# Patient Record
Sex: FEMALE | Race: WHITE | Employment: FULL TIME | URBAN - METROPOLITAN AREA
[De-identification: names, ages, dates, MRNs, and addresses within clinical notes are randomized per-mention and may not be internally consistent; named-entity substitution may affect disease eponyms.]

---

## 2018-03-14 ENCOUNTER — HOSPITAL ENCOUNTER (OUTPATIENT)
Dept: SURGERY | Age: 53
Discharge: HOME | End: 2018-03-14

## 2018-03-14 VITALS — BODY MASS INDEX: 22.49 KG/M2 | HEIGHT: 65 IN | WEIGHT: 135 LBS

## 2018-03-14 RX ORDER — NAPROXEN SODIUM 220 MG
220 TABLET ORAL
COMMUNITY

## 2018-03-14 RX ORDER — ESTRADIOL 1 MG/1
1 TABLET ORAL DAILY
COMMUNITY

## 2018-03-14 RX ORDER — NITROFURANTOIN (MACROCRYSTALS) 100 MG/1
CAPSULE ORAL DAILY
COMMUNITY

## 2018-03-14 NOTE — PERIOP NOTES
Patient verified name, , and surgery as listed in [facility]. Type 2 surgery, PAT phone assessment complete. Orders NOT received. Labs per surgeon: Orders will be faxed to [phone] after patient's pre-op appointment on 3/15/18. Labs per anesthesia protocol: Hgb, K+, Creatinine; orders signed and held in [facility]. Patient coming to Entrance B/Outpatient lab prior to surgery to have blood work completed. Patient answered medical/surgical history questions at their best of ability. All prior to admission medications documented in [facility]. Patient instructed to take the following medications the day of surgery according to anesthesia guidelines with a small sip of water: Estradiol. Hold all vitamins 7 days prior to surgery and NSAIDS 5 days prior to surgery. Medications to be held: Aleve. Patient instructed on the following:  Arrive at [address], time of arrival to be called the day before by 1700  NPO after midnight including gum, mints, and ice chips  Responsible adult must drive patient to the hospital, stay during surgery, and patient will  need supervision 24 hours after anesthesia  Use antibacterial soap in shower the night before surgery and on the morning of surgery  Leave all valuables (money and jewelry) at home but bring insurance card and ID on       DOS  Do not wear make-up, nail polish, lotions, cologne, perfumes, powders, or oil on skin. Patient teach back successful and patient demonstrates knowledge of instruction.

## 2018-03-15 ENCOUNTER — HOSPITAL ENCOUNTER (OUTPATIENT)
Dept: LAB | Age: 53
Discharge: HOME | End: 2018-03-15
Attending: PLASTIC SURGERY
Payer: COMMERCIAL

## 2018-03-15 LAB
CREAT SERPL-MCNC: 1.31 MG/DL (ref 0.6–1)
HGB BLD-MCNC: 14.7 G/DL (ref 11.7–15.4)
POTASSIUM SERPL-SCNC: 4 MMOL/L (ref 3.5–5.1)

## 2018-03-15 PROCEDURE — 84132 ASSAY OF SERUM POTASSIUM: CPT | Performed by: PLASTIC SURGERY

## 2018-03-15 PROCEDURE — 36415 COLL VENOUS BLD VENIPUNCTURE: CPT | Performed by: PLASTIC SURGERY

## 2018-03-15 PROCEDURE — 85018 HEMOGLOBIN: CPT | Performed by: PLASTIC SURGERY

## 2018-03-15 PROCEDURE — 82565 ASSAY OF CREATININE: CPT | Performed by: PLASTIC SURGERY

## 2018-03-15 NOTE — PERIOP NOTES
Lab results within limits per anesthesia protocol; OK for surgery.      Recent Results (from the past 12 hour(s))   CREATININE    Collection Time: 03/15/18  2:31 PM   Result Value Ref Range    Creatinine 1.31 (H) 0.6 - 1.0 MG/DL   HEMOGLOBIN    Collection Time: 03/15/18  2:31 PM   Result Value Ref Range    HGB 14.7 11.7 - 15.4 g/dL   POTASSIUM    Collection Time: 03/15/18  2:31 PM   Result Value Ref Range    Potassium 4.0 3.5 - 5.1 mmol/L

## 2018-03-21 ENCOUNTER — HOSPITAL ENCOUNTER (OUTPATIENT)
Age: 53
Setting detail: OUTPATIENT SURGERY
Discharge: HOME | End: 2018-03-21
Attending: PLASTIC SURGERY | Admitting: PLASTIC SURGERY
Payer: COMMERCIAL

## 2018-03-21 ENCOUNTER — ANESTHESIA EVENT (OUTPATIENT)
Dept: SURGERY | Age: 53
End: 2018-03-21
Payer: COMMERCIAL

## 2018-03-21 ENCOUNTER — ANESTHESIA (OUTPATIENT)
Dept: SURGERY | Age: 53
End: 2018-03-21
Payer: COMMERCIAL

## 2018-03-21 VITALS
TEMPERATURE: 97.6 F | OXYGEN SATURATION: 95 % | SYSTOLIC BLOOD PRESSURE: 115 MMHG | BODY MASS INDEX: 22.92 KG/M2 | DIASTOLIC BLOOD PRESSURE: 72 MMHG | RESPIRATION RATE: 20 BRPM | WEIGHT: 137.56 LBS | HEIGHT: 65 IN | HEART RATE: 75 BPM

## 2018-03-21 PROCEDURE — 77030008467 HC STPLR SKN COVD -B: Performed by: PLASTIC SURGERY

## 2018-03-21 PROCEDURE — 76210000006 HC OR PH I REC 0.5 TO 1 HR: Performed by: PLASTIC SURGERY

## 2018-03-21 PROCEDURE — 74011000258 HC RX REV CODE- 258: Performed by: PLASTIC SURGERY

## 2018-03-21 PROCEDURE — 74011250636 HC RX REV CODE- 250/636: Performed by: ANESTHESIOLOGY

## 2018-03-21 PROCEDURE — 74011250636 HC RX REV CODE- 250/636

## 2018-03-21 PROCEDURE — 74011000250 HC RX REV CODE- 250: Performed by: ANESTHESIOLOGY

## 2018-03-21 PROCEDURE — 88307 TISSUE EXAM BY PATHOLOGIST: CPT | Performed by: PLASTIC SURGERY

## 2018-03-21 PROCEDURE — 77030031139 HC SUT VCRL2 J&J -A: Performed by: PLASTIC SURGERY

## 2018-03-21 PROCEDURE — 77030013567 HC DRN WND RESERV BARD -A: Performed by: PLASTIC SURGERY

## 2018-03-21 PROCEDURE — 77030020143 HC AIRWY LARYN INTUB CGAS -A: Performed by: ANESTHESIOLOGY

## 2018-03-21 PROCEDURE — 77030032490 HC SLV COMPR SCD KNE COVD -B: Performed by: PLASTIC SURGERY

## 2018-03-21 PROCEDURE — 74011000250 HC RX REV CODE- 250

## 2018-03-21 PROCEDURE — 77030018836 HC SOL IRR NACL ICUM -A: Performed by: PLASTIC SURGERY

## 2018-03-21 PROCEDURE — 77030011640 HC PAD GRND REM COVD -A: Performed by: PLASTIC SURGERY

## 2018-03-21 PROCEDURE — 74011250636 HC RX REV CODE- 250/636: Performed by: PLASTIC SURGERY

## 2018-03-21 PROCEDURE — 77030012414: Performed by: PLASTIC SURGERY

## 2018-03-21 PROCEDURE — 77030002933 HC SUT MCRYL J&J -A: Performed by: PLASTIC SURGERY

## 2018-03-21 PROCEDURE — 77030002996 HC SUT SLK J&J -A: Performed by: PLASTIC SURGERY

## 2018-03-21 PROCEDURE — 77030020782 HC GWN BAIR PAWS FLX 3M -B: Performed by: ANESTHESIOLOGY

## 2018-03-21 PROCEDURE — 76010000171 HC OR TIME 2 TO 2.5 HR INTENSV-TIER 1: Performed by: PLASTIC SURGERY

## 2018-03-21 PROCEDURE — 74011000250 HC RX REV CODE- 250: Performed by: PLASTIC SURGERY

## 2018-03-21 PROCEDURE — 76060000035 HC ANESTHESIA 2 TO 2.5 HR: Performed by: PLASTIC SURGERY

## 2018-03-21 PROCEDURE — 74011250637 HC RX REV CODE- 250/637: Performed by: ANESTHESIOLOGY

## 2018-03-21 PROCEDURE — 77030002916 HC SUT ETHLN J&J -A: Performed by: PLASTIC SURGERY

## 2018-03-21 PROCEDURE — 76010000131 HC OR TIME 2 TO 2.5 HR: Performed by: PLASTIC SURGERY

## 2018-03-21 RX ORDER — ACETAMINOPHEN 500 MG
1000 TABLET ORAL ONCE
Status: COMPLETED | OUTPATIENT
Start: 2018-03-21 | End: 2018-03-21

## 2018-03-21 RX ORDER — SODIUM CHLORIDE, SODIUM LACTATE, POTASSIUM CHLORIDE, CALCIUM CHLORIDE 600; 310; 30; 20 MG/100ML; MG/100ML; MG/100ML; MG/100ML
1000 INJECTION, SOLUTION INTRAVENOUS CONTINUOUS
Status: DISCONTINUED | OUTPATIENT
Start: 2018-03-21 | End: 2018-03-21 | Stop reason: HOSPADM

## 2018-03-21 RX ORDER — FENTANYL CITRATE 50 UG/ML
INJECTION, SOLUTION INTRAMUSCULAR; INTRAVENOUS AS NEEDED
Status: DISCONTINUED | OUTPATIENT
Start: 2018-03-21 | End: 2018-03-21 | Stop reason: HOSPADM

## 2018-03-21 RX ORDER — LIDOCAINE HYDROCHLORIDE 10 MG/ML
0.1 INJECTION INFILTRATION; PERINEURAL AS NEEDED
Status: DISCONTINUED | OUTPATIENT
Start: 2018-03-21 | End: 2018-03-21 | Stop reason: HOSPADM

## 2018-03-21 RX ORDER — MIDAZOLAM HYDROCHLORIDE 1 MG/ML
2 INJECTION, SOLUTION INTRAMUSCULAR; INTRAVENOUS
Status: COMPLETED | OUTPATIENT
Start: 2018-03-21 | End: 2018-03-21

## 2018-03-21 RX ORDER — KETOROLAC TROMETHAMINE 30 MG/ML
30 INJECTION, SOLUTION INTRAMUSCULAR; INTRAVENOUS ONCE
Status: COMPLETED | OUTPATIENT
Start: 2018-03-21 | End: 2018-03-21

## 2018-03-21 RX ORDER — SCOLOPAMINE TRANSDERMAL SYSTEM 1 MG/1
1 PATCH, EXTENDED RELEASE TRANSDERMAL ONCE
Status: DISCONTINUED | OUTPATIENT
Start: 2018-03-21 | End: 2018-03-21 | Stop reason: HOSPADM

## 2018-03-21 RX ORDER — SODIUM CHLORIDE 0.9 % (FLUSH) 0.9 %
5-10 SYRINGE (ML) INJECTION AS NEEDED
Status: DISCONTINUED | OUTPATIENT
Start: 2018-03-21 | End: 2018-03-21 | Stop reason: HOSPADM

## 2018-03-21 RX ORDER — ONDANSETRON 2 MG/ML
INJECTION INTRAMUSCULAR; INTRAVENOUS AS NEEDED
Status: DISCONTINUED | OUTPATIENT
Start: 2018-03-21 | End: 2018-03-21 | Stop reason: HOSPADM

## 2018-03-21 RX ORDER — PROPOFOL 10 MG/ML
INJECTION, EMULSION INTRAVENOUS AS NEEDED
Status: DISCONTINUED | OUTPATIENT
Start: 2018-03-21 | End: 2018-03-21 | Stop reason: HOSPADM

## 2018-03-21 RX ORDER — DEXAMETHASONE SODIUM PHOSPHATE 4 MG/ML
INJECTION, SOLUTION INTRA-ARTICULAR; INTRALESIONAL; INTRAMUSCULAR; INTRAVENOUS; SOFT TISSUE AS NEEDED
Status: DISCONTINUED | OUTPATIENT
Start: 2018-03-21 | End: 2018-03-21 | Stop reason: HOSPADM

## 2018-03-21 RX ORDER — SODIUM CHLORIDE 0.9 % (FLUSH) 0.9 %
5-10 SYRINGE (ML) INJECTION EVERY 8 HOURS
Status: DISCONTINUED | OUTPATIENT
Start: 2018-03-21 | End: 2018-03-21 | Stop reason: HOSPADM

## 2018-03-21 RX ORDER — HYDROMORPHONE HYDROCHLORIDE 2 MG/ML
0.5 INJECTION, SOLUTION INTRAMUSCULAR; INTRAVENOUS; SUBCUTANEOUS
Status: DISCONTINUED | OUTPATIENT
Start: 2018-03-21 | End: 2018-03-21 | Stop reason: HOSPADM

## 2018-03-21 RX ORDER — ALBUTEROL SULFATE 0.83 MG/ML
2.5 SOLUTION RESPIRATORY (INHALATION) AS NEEDED
Status: DISCONTINUED | OUTPATIENT
Start: 2018-03-21 | End: 2018-03-21 | Stop reason: HOSPADM

## 2018-03-21 RX ORDER — ONDANSETRON 2 MG/ML
4 INJECTION INTRAMUSCULAR; INTRAVENOUS
Status: DISCONTINUED | OUTPATIENT
Start: 2018-03-21 | End: 2018-03-21 | Stop reason: HOSPADM

## 2018-03-21 RX ORDER — LIDOCAINE HYDROCHLORIDE 20 MG/ML
INJECTION, SOLUTION EPIDURAL; INFILTRATION; INTRACAUDAL; PERINEURAL AS NEEDED
Status: DISCONTINUED | OUTPATIENT
Start: 2018-03-21 | End: 2018-03-21 | Stop reason: HOSPADM

## 2018-03-21 RX ADMIN — KETOROLAC TROMETHAMINE 30 MG: 30 INJECTION, SOLUTION INTRAMUSCULAR at 10:52

## 2018-03-21 RX ADMIN — FENTANYL CITRATE 100 MCG: 50 INJECTION, SOLUTION INTRAMUSCULAR; INTRAVENOUS at 09:18

## 2018-03-21 RX ADMIN — FENTANYL CITRATE 50 MCG: 50 INJECTION, SOLUTION INTRAMUSCULAR; INTRAVENOUS at 08:37

## 2018-03-21 RX ADMIN — SODIUM CHLORIDE, SODIUM LACTATE, POTASSIUM CHLORIDE, AND CALCIUM CHLORIDE 1000 ML: 600; 310; 30; 20 INJECTION, SOLUTION INTRAVENOUS at 06:25

## 2018-03-21 RX ADMIN — FENTANYL CITRATE 50 MCG: 50 INJECTION, SOLUTION INTRAMUSCULAR; INTRAVENOUS at 08:23

## 2018-03-21 RX ADMIN — HYDROMORPHONE HYDROCHLORIDE 0.5 MG: 2 INJECTION, SOLUTION INTRAMUSCULAR; INTRAVENOUS; SUBCUTANEOUS at 10:34

## 2018-03-21 RX ADMIN — MIDAZOLAM HYDROCHLORIDE 2 MG: 1 INJECTION, SOLUTION INTRAMUSCULAR; INTRAVENOUS at 08:11

## 2018-03-21 RX ADMIN — LIDOCAINE HYDROCHLORIDE 0.1 ML: 10 INJECTION, SOLUTION INFILTRATION; PERINEURAL at 06:25

## 2018-03-21 RX ADMIN — LIDOCAINE HYDROCHLORIDE 100 MG: 20 INJECTION, SOLUTION EPIDURAL; INFILTRATION; INTRACAUDAL; PERINEURAL at 08:23

## 2018-03-21 RX ADMIN — DEXAMETHASONE SODIUM PHOSPHATE 5 MG: 4 INJECTION, SOLUTION INTRA-ARTICULAR; INTRALESIONAL; INTRAMUSCULAR; INTRAVENOUS; SOFT TISSUE at 08:37

## 2018-03-21 RX ADMIN — ACETAMINOPHEN 1000 MG: 500 TABLET, FILM COATED ORAL at 07:00

## 2018-03-21 RX ADMIN — PROPOFOL 100 MG: 10 INJECTION, EMULSION INTRAVENOUS at 08:23

## 2018-03-21 RX ADMIN — ONDANSETRON 4 MG: 2 INJECTION INTRAMUSCULAR; INTRAVENOUS at 08:37

## 2018-03-21 RX ADMIN — CEFAZOLIN 1 G: 1 INJECTION, POWDER, FOR SOLUTION INTRAMUSCULAR; INTRAVENOUS; PARENTERAL at 08:22

## 2018-03-21 RX ADMIN — HYDROMORPHONE HYDROCHLORIDE 0.5 MG: 2 INJECTION, SOLUTION INTRAMUSCULAR; INTRAVENOUS; SUBCUTANEOUS at 10:49

## 2018-03-21 NOTE — ANESTHESIA PREPROCEDURE EVALUATION
Anesthetic History     PONV          Review of Systems / Medical History  Patient summary reviewed and pertinent labs reviewed    Pulmonary                   Neuro/Psych   Within defined limits           Cardiovascular    Hypertension                   GI/Hepatic/Renal         Renal disease: CRI       Endo/Other             Other Findings              Physical Exam    Airway  Mallampati: II  TM Distance: 4 - 6 cm  Neck ROM: normal range of motion   Mouth opening: Normal     Cardiovascular    Rhythm: regular  Rate: normal      Pertinent negatives: No murmur, JVD and peripheral edema   Dental  No notable dental hx       Pulmonary  Breath sounds clear to auscultation               Abdominal         Other Findings            Anesthetic Plan    ASA: 2  Anesthesia type: general          Induction: Intravenous  Anesthetic plan and risks discussed with: Patient      LMA general

## 2018-03-21 NOTE — DISCHARGE INSTRUCTIONS
Keep dressings in place until follow up  Medications as perscribed     Breast Reduction: What to Expect at [address] [facility]  Breast reduction surgery removes some of the breast tissue and skin from the breasts. This reshapes and lifts the breasts and reduces their size. It can also make the dark area around the nipple smaller. After surgery, you will probably feel weak. You may feel sore for 2 to 3 weeks. You also may feel pulling or stretching in your breast area. Although you may need pain medicine for a week or two, you can expect to feel better and stronger each day. For several weeks, you may get tired easily or have less energy than usual. You also may have the feeling that fluid is moving in your breasts. This feeling is normal and will go away over time. Stitches usually are removed in 5 to 10 days. Your new breasts may feel firmer and look rounder. Breast reduction may change the normal feeling in your breast. But in time, some feeling may return. Keep in mind that it may take time to get used to your new breasts. You will have swelling at first, but the breasts will soften and develop better shape over time. This care sheet gives you a general idea about how long it will take for you to recover. But each person recovers at a different pace. Follow the steps below to get better as quickly as possible. How can you care for yourself at home? Activity  ? · Rest when you feel tired. Getting enough sleep will help you recover. ? · For about 2 weeks after surgery, avoid lifting anything that would make you strain. This may include heavy grocery bags and milk containers, a heavy briefcase or backpack, cat litter or dog food bags, a vacuum , or a child. Do not lift anything over your head for 2 to 3 weeks. ? · Ask your doctor when you can drive again. ? · Ask your doctor when it is okay for you to have sex. ? · You can take your first shower the day after your drain or bandage is removed. This is usually within about 1 week. Sometimes doctors say it is okay to shower the day after surgery. Do not take a bath or soak in a hot tub for about 4 weeks. ? · You will probably be able to go back to work or your normal routine in 2 to 3 weeks. This depends on the type of work you do and any further treatment. Diet  ? · You can eat your normal diet. If your stomach is upset, try bland, low-fat foods like plain rice, broiled chicken, toast, and yogurt. ? · Drink plenty of fluids (unless your doctor tells you not to). ? · You may notice that your bowel movements are not regular right after your surgery. This is common. Try to avoid constipation and straining with bowel movements. Take a fiber supplement. If you have not had a bowel movement after a couple of days, take a mild laxative. Medicines  ? · Your doctor will tell you if and when you can restart your medicines. He or she will also give you instructions about taking any new medicines. ? · If you take blood thinners, such as warfarin (Coumadin), clopidogrel (Plavix), or aspirin, be sure to talk to your doctor. He or she will tell you if and when to start taking those medicines again. Make sure that you understand exactly what your doctor wants you to do. ? · Take pain medicines exactly as directed. ¨ If the doctor gave you a prescription medicine for pain, take it as prescribed. ¨ If you are not taking a prescription pain medicine, ask your doctor if you can take an over-the-counter medicine. ? · If you think your pain medicine is making you sick to your stomach:  ¨ Take your medicine after meals (unless your doctor has told you not to). ¨ Ask your doctor for a different pain medicine. ? · If you were given medicine for nausea, take it as directed. ? · If your doctor prescribed antibiotics, take them as directed. Do not stop taking them just because you feel better. You need to take the full course of antibiotics. Incision care  ? · If your doctor gave you specific instructions on how to care for your incision, follow those instructions. ? · You may be wearing a special bra that holds your bandages in place after the surgery. Your doctor will tell you when you can stop wearing the bra. Your doctor may want you to wear the bra at night as well as during the day for several weeks. Do not wear an underwire bra for 1 month. ? · If you have strips of tape on your incision, leave the tape on for a week or until it falls off. Or follow your doctor's instructions for removing the tape. ? · Wash the area daily with warm, soapy water, and pat it dry. Don't use hydrogen peroxide or alcohol, which can slow healing. ? · You may cover the area with a gauze bandage if it weeps or rubs against clothing. Change the bandage every day. Consider having someone help you with this. Exercise  ? · Try to walk each day. Start by walking a little more than you did the day before. Bit by bit, increase the amount you walk. Walking boosts blood flow and helps prevent pneumonia and constipation. ? · Avoid strenuous activities, such as bicycle riding, jogging, weight lifting, or aerobic exercise, until your doctor says it is okay. ? · Your doctor will tell you when to begin stretching exercises and normal activities. ?Ice  ? · Put ice or a cold pack over your breast for 10 to 20 minutes at a time. Try to do this every 1 to 2 hours for the next 3 days (when you are awake) or until the swelling goes down. Put a thin cloth between the ice and your skin. Other instructions  ? · You may have one or more drains near your incisions. Your doctor will tell you how to take care of them. Drains are usually removed in the first week after surgery. Follow-up care is a key part of your treatment and safety. Be sure to make and go to all appointments, and call your doctor if you are having problems.  It's also a good idea to know your test results and keep a list of the medicines you take. When should you call for help? Call 911 anytime you think you may need emergency care. For example, call if:  ? · You passed out (lost consciousness). ? · You have sudden chest pain and shortness of breath, or you cough up blood. ?Call your doctor now or seek immediate medical care if:  ? · You have pain that does not get better after you take pain medicine. ? · You have loose stitches, or your incision comes open. ? · You are bleeding from the incision. ? · You have signs of infection, such as:  ¨ Increased pain, swelling, warmth, or redness. ¨ Red streaks leading from the incision. ¨ Pus draining from the incision. ¨ A fever. ? · You have signs of a blood clot in your leg, such as:  ¨ Pain in your calf, back of the knee, thigh, or groin. ¨ Redness and swelling in your leg or groin. ? Watch closely for any changes in your health, and be sure to contact your doctor if:  ? · You do not get better as expected. Where can you learn more? Go to http://ngocAgisticserin.info/. Enter T113 in the search box to learn more about \"Breast Reduction: What to Expect at Home. \"  Current as of: October 13, 2016  Content Version: 11.4  © 6513-5204 [employer] Care instructions adapted under license by [employer] (which disclaims liability or warranty for this information). If you have questions about a medical condition or this instruction, always ask your healthcare professional. [place] [address] any warranty or liability for your use of this information. Surgical Drain Care: Care Instructions  What is a surgical drain? After a surgery, fluid may collect inside your body in the surgical area. This makes an infection or other problems more likely. A surgical drain allows the fluid to flow out. The doctor will put a thin rubber tube into the area of your body where the fluid is likely to collect.  The rubber tube will carry the fluid outside your body. The most common type of surgical drain carries the fluid into a collection bulb that you empty. This is called a Elton-Blanco drain. The drain uses suction created by the bulb to pull the fluid from your body into the bulb. The rubber tube will probably be held in place by one or two stitches in your skin. Most people attach the bulb with a safety pin to clothing or near the bandage so that it doesn't flip around or pull on the stitches. When you first get the drain, the fluid will be bloody. It will change color from red to pink to a light yellow or clear as the wound heals and the fluid starts to go away. Your doctor may give you specific information on when you no longer need the drain and when it will be removed. In general, you will need the drain until you are collecting less than about 2 tablespoons of fluid in 24 hours. Follow-up care is a key part of your treatment and safety. Be sure to make and go to all appointments, and call your doctor if you are having problems. It's also a good idea to know your test results and keep a list of the medicines you take. How can you care for yourself at home? Fluid collection  Follow any instructions your doctor gives you. How often you empty the bulb depends on how much fluid is draining. Empty the bulb when it is half full. To empty the bulb:  · Wash your hands with soap and water. · Take the plug out of the bulb. · Empty the bulb. If your doctor asks you to measure the fluid, empty the fluid into a measuring cup, and write down the color and how much you collected. Your doctor will want to know this information. How often you empty the bulb depends on how much fluid there is. Doctors often suggest emptying it when it's about half full. · Clean the plug with alcohol. · Squeeze the bulb until it is flat. This removes all the air from the bulb. You may need to put the bulb on a table or a counter to flatten it.   · Keep the bulb flat and put the plug in. · The bulb should stay flat after you put the plug back in. This creates the suction that pulls the fluid into the bulb. · Empty the fluid into the toilet. · Wash your hands. Bandage care  You may have a bandage. Your doctor will tell you how often to change it. · Wash your hands with soap and water. · Take off the bandage from around the drain. · Clean the drain site and the skin around it with soap and water. Use gauze or a cotton swab. · When the site is dry, put on a new bandage. Drain care  Squeezing or \"milking\" the tube can help prevent clogs so that it drains correctly. Your doctor will tell you when you need to do this. In general, you do this when:  · You see a clot in the tube that is preventing fluid from draining. The clot may look like a dark, stringy lining. · You see fluid leaking around the tube where it goes into the skin. · You think there is no suction in the drain. To milk the tube:  · Use one hand to hold and pinch the tube where it leaves the skin. · With the other hand, pinch the tube with your thumb and first finger just below where you're holding it. · Slowly and firmly push your thumb and first finger down the tubing toward the bulb. · Do this as many times as you need to. The clot should move down the tube and into the bulb. When should you call for help? Call your doctor now or seek immediate medical care if:  ? · You have signs of infection, such as:  ¨ Increased pain, swelling, warmth, or redness around the area. ¨ Red streaks leading from the area. ¨ Pus draining from the area. ¨ A fever. ? · You see a sudden change in the color or smell of the drainage. ? · The tube is coming loose where it leaves your skin. ? Watch closely for changes in your health, and be sure to contact your doctor if:  ? · You see a lot of fluid around the drain. ? · You cannot remove a clot from the tube by milking the tube. Where can you learn more?   Go to http://ngoc-erin.info/. Enter K117 in the search box to learn more about \"Surgical Drain Care: Care Instructions. \"  Current as of: March 20, 2017  Content Version: 11.4  © 8611-1177 [employer]. Care instructions adapted under license by [employer] (which disclaims liability or warranty for this information). If you have questions about a medical condition or this instruction, always ask your healthcare professional. [place] [address] any warranty or liability for your use of this information.

## 2018-03-21 NOTE — BRIEF OP NOTE
BRIEF OPERATIVE NOTE    Date of Procedure: 3/21/2018   Preoperative Diagnosis: Breast hypertrophy [N62]  Breast pain [N64.4]  Disorder of neck [M53.82]  Low back pain with sciatica, sciatica laterality unspecified, unspecified back pain laterality, unspecified chronicity [M54.40]  Necrosis, breast fat [N64.1]  Postoperative Diagnosis: Breast hypertrophy [N62]  Breast pain     Procedure(s):  BILATERAL BREAST REDUCTION  Surgeon(s) and Role:     * Darnell Pérez MD - Primary         Assistant Staff: Patrice COX      Surgical Staff:  Circ-1: Hugh Vega RN  Scrub Tech-1: Mary Jane Edwardsison  Event Time In   Incision Start 8676   Incision Close      Anesthesia: General   Estimated Blood Loss: 50cc  Specimens:   ID Type Source Tests Collected by Time Destination   1 : LEFT BREAST TISSUE Fresh Breast  Darnell Pérez MD 3/21/2018 9348 Pathology   2 : RIGHT BREAST TISSUE Fresh Breast  Darnell Pérez MD 3/21/2018 7452 Pathology      Findings: Right Breast 471 grams  Left breast 088 grams  Complications: none  Implants: * No implants in log *

## 2018-03-21 NOTE — IP AVS SNAPSHOT
Evelia Hughes 
 
 
 [address] [id number] 
[phone] Patient: Katharina Jean MRN: [id number] :1965 About your hospitalization You were admitted on:  2018 You last received care in the:  [facility] PACU You were discharged on:  2018 Why you were hospitalized Your primary diagnosis was:  Not on File Follow-up Information Follow up With Details Comments Contact Info Angel Vasquez MD Follow up on 3/26/2018 Follow up on Monday [address] [facility] [address] Surgery [place] Horan [id number] 
[phone] Lula Tovar, [address] [place] [address] 
[phone] Discharge Orders None A check katerina indicates which time of day the medication should be taken. My Medications CONTINUE taking these medications Instructions Each Dose to Equal  
 Morning Noon Evening Bedtime ALEVE 220 mg tablet Generic drug:  naproxen sodium Your last dose was: Your next dose is: Take 220 mg by mouth daily as needed. 220 mg  
    
   
   
   
  
 BENAZEPRIL-HYDROCHLOROTHIAZIDE PO Your last dose was: Your next dose is: Take  by mouth daily. ESTRACE 1 mg tablet Generic drug:  estradiol Your last dose was: Your next dose is: Take 1 mg by mouth daily. 1 mg MACRODANTIN 100 mg capsule Generic drug:  nitrofurantoin Your last dose was: Your next dose is: Take  by mouth daily. Indications: PREVENTION OF BACTERIAL URINARY TRACT INFECTION Discharge Instructions Keep dressings in place until follow up Medications as perscribed Breast Reduction: What to Expect at [facility] Your Recovery Breast reduction surgery removes some of the breast tissue and skin from the breasts. This reshapes and lifts the breasts and reduces their size. It can also make the dark area around the nipple smaller. After surgery, you will probably feel weak. You may feel sore for 2 to 3 weeks. You also may feel pulling or stretching in your breast area. Although you may need pain medicine for a week or two, you can expect to feel better and stronger each day. For several weeks, you may get tired easily or have less energy than usual. You also may have the feeling that fluid is moving in your breasts. This feeling is normal and will go away over time. Stitches usually are removed in 5 to 10 days. Your new breasts may feel firmer and look rounder. Breast reduction may change the normal feeling in your breast. But in time, some feeling may return. Keep in mind that it may take time to get used to your new breasts. You will have swelling at first, but the breasts will soften and develop better shape over time. This care sheet gives you a general idea about how long it will take for you to recover. But each person recovers at a different pace. Follow the steps below to get better as quickly as possible. How can you care for yourself at home? Activity ? · Rest when you feel tired. Getting enough sleep will help you recover. ? · For about 2 weeks after surgery, avoid lifting anything that would make you strain. This may include heavy grocery bags and milk containers, a heavy briefcase or backpack, cat litter or dog food bags, a vacuum , or a child. Do not lift anything over your head for 2 to 3 weeks. ? · Ask your doctor when you can drive again. ? · Ask your doctor when it is okay for you to have sex. ? · You can take your first shower the day after your drain or bandage is removed. This is usually within about 1 week. Sometimes doctors say it is okay to shower the day after surgery. Do not take a bath or soak in a hot tub for about 4 weeks. ? · You will probably be able to go back to work or your normal routine in 2 to 3 weeks. This depends on the type of work you do and any further treatment. Diet ? · You can eat your normal diet. If your stomach is upset, try bland, low-fat foods like plain rice, broiled chicken, toast, and yogurt. ? · Drink plenty of fluids (unless your doctor tells you not to). ? · You may notice that your bowel movements are not regular right after your surgery. This is common. Try to avoid constipation and straining with bowel movements. Take a fiber supplement. If you have not had a bowel movement after a couple of days, take a mild laxative. Medicines ? · Your doctor will tell you if and when you can restart your medicines. He or she will also give you instructions about taking any new medicines. ? · If you take blood thinners, such as warfarin (Coumadin), clopidogrel (Plavix), or aspirin, be sure to talk to your doctor. He or she will tell you if and when to start taking those medicines again. Make sure that you understand exactly what your doctor wants you to do. ? · Take pain medicines exactly as directed. ¨ If the doctor gave you a prescription medicine for pain, take it as prescribed. ¨ If you are not taking a prescription pain medicine, ask your doctor if you can take an over-the-counter medicine. ? · If you think your pain medicine is making you sick to your stomach: 
¨ Take your medicine after meals (unless your doctor has told you not to). ¨ Ask your doctor for a different pain medicine. ? · If you were given medicine for nausea, take it as directed. ? · If your doctor prescribed antibiotics, take them as directed. Do not stop taking them just because you feel better. You need to take the full course of antibiotics. Incision care ? · If your doctor gave you specific instructions on how to care for your incision, follow those instructions. ? · You may be wearing a special bra that holds your bandages in place after the surgery. Your doctor will tell you when you can stop wearing the bra. Your doctor may want you to wear the bra at night as well as during the day for several weeks. Do not wear an underwire bra for 1 month. ? · If you have strips of tape on your incision, leave the tape on for a week or until it falls off. Or follow your doctor's instructions for removing the tape. ? · Wash the area daily with warm, soapy water, and pat it dry. Don't use hydrogen peroxide or alcohol, which can slow healing. ? · You may cover the area with a gauze bandage if it weeps or rubs against clothing. Change the bandage every day. Consider having someone help you with this. Exercise ? · Try to walk each day. Start by walking a little more than you did the day before. Bit by bit, increase the amount you walk. Walking boosts blood flow and helps prevent pneumonia and constipation. ? · Avoid strenuous activities, such as bicycle riding, jogging, weight lifting, or aerobic exercise, until your doctor says it is okay. ? · Your doctor will tell you when to begin stretching exercises and normal activities. ?Ice ? · Put ice or a cold pack over your breast for 10 to 20 minutes at a time. Try to do this every 1 to 2 hours for the next 3 days (when you are awake) or until the swelling goes down. Put a thin cloth between the ice and your skin. Other instructions ? · You may have one or more drains near your incisions. Your doctor will tell you how to take care of them. Drains are usually removed in the first week after surgery. Follow-up care is a key part of your treatment and safety. Be sure to make and go to all appointments, and call your doctor if you are having problems. It's also a good idea to know your test results and keep a list of the medicines you take. When should you call for help? Call 911 anytime you think you may need emergency care. For example, call if: 
? · You passed out (lost consciousness). ? · You have sudden chest pain and shortness of breath, or you cough up blood. ?Call your doctor now or seek immediate medical care if: 
? · You have pain that does not get better after you take pain medicine. ? · You have loose stitches, or your incision comes open. ? · You are bleeding from the incision. ? · You have signs of infection, such as: 
¨ Increased pain, swelling, warmth, or redness. ¨ Red streaks leading from the incision. ¨ Pus draining from the incision. ¨ A fever. ? · You have signs of a blood clot in your leg, such as: 
¨ Pain in your calf, back of the knee, thigh, or groin. ¨ Redness and swelling in your leg or groin. ? Watch closely for any changes in your health, and be sure to contact your doctor if: 
? · You do not get better as expected. Where can you learn more? Go to http://ngoc-erin.info/. Enter T113 in the search box to learn more about \"Breast Reduction: What to Expect at Home. \" Current as of: October 13, 2016 Content Version: 11.4 © 6060-6943 [employer]. Care instructions adapted under license by [employer] (which disclaims liability or warranty for this information). If you have questions about a medical condition or this instruction, always ask your healthcare professional. [place] [address] any warranty or liability for your use of this information. Surgical Drain Care: Care Instructions What is a surgical drain? After a surgery, fluid may collect inside your body in the surgical area. This makes an infection or other problems more likely. A surgical drain allows the fluid to flow out. The doctor will put a thin rubber tube into the area of your body where the fluid is likely to collect.  The rubber tube will carry the fluid outside your body. The most common type of surgical drain carries the fluid into a collection bulb that you empty. This is called a Elton-Blanco drain. The drain uses suction created by the bulb to pull the fluid from your body into the bulb. The rubber tube will probably be held in place by one or two stitches in your skin. Most people attach the bulb with a safety pin to clothing or near the bandage so that it doesn't flip around or pull on the stitches. When you first get the drain, the fluid will be bloody. It will change color from red to pink to a light yellow or clear as the wound heals and the fluid starts to go away. Your doctor may give you specific information on when you no longer need the drain and when it will be removed. In general, you will need the drain until you are collecting less than about 2 tablespoons of fluid in 24 hours. Follow-up care is a key part of your treatment and safety. Be sure to make and go to all appointments, and call your doctor if you are having problems. It's also a good idea to know your test results and keep a list of the medicines you take. How can you care for yourself at home? Fluid collection Follow any instructions your doctor gives you. How often you empty the bulb depends on how much fluid is draining. Empty the bulb when it is half full. To empty the bulb: 
· Wash your hands with soap and water. · Take the plug out of the bulb. · Empty the bulb. If your doctor asks you to measure the fluid, empty the fluid into a measuring cup, and write down the color and how much you collected. Your doctor will want to know this information. How often you empty the bulb depends on how much fluid there is. Doctors often suggest emptying it when it's about half full. · Clean the plug with alcohol. · Squeeze the bulb until it is flat. This removes all the air from the bulb. You may need to put the bulb on a table or a counter to flatten it. · Keep the bulb flat and put the plug in. · The bulb should stay flat after you put the plug back in. This creates the suction that pulls the fluid into the bulb. · Empty the fluid into the toilet. · Wash your hands. Bandage care You may have a bandage. Your doctor will tell you how often to change it. · Wash your hands with soap and water. · Take off the bandage from around the drain. · Clean the drain site and the skin around it with soap and water. Use gauze or a cotton swab. · When the site is dry, put on a new bandage. Drain care Squeezing or \"milking\" the tube can help prevent clogs so that it drains correctly. Your doctor will tell you when you need to do this. In general, you do this when: 
· You see a clot in the tube that is preventing fluid from draining. The clot may look like a dark, stringy lining. · You see fluid leaking around the tube where it goes into the skin. · You think there is no suction in the drain. To milk the tube: · Use one hand to hold and pinch the tube where it leaves the skin. · With the other hand, pinch the tube with your thumb and first finger just below where you're holding it. · Slowly and firmly push your thumb and first finger down the tubing toward the bulb. · Do this as many times as you need to. The clot should move down the tube and into the bulb. When should you call for help? Call your doctor now or seek immediate medical care if: 
? · You have signs of infection, such as: 
¨ Increased pain, swelling, warmth, or redness around the area. ¨ Red streaks leading from the area. ¨ Pus draining from the area. ¨ A fever. ? · You see a sudden change in the color or smell of the drainage. ? · The tube is coming loose where it leaves your skin. ? Watch closely for changes in your health, and be sure to contact your doctor if: 
? · You see a lot of fluid around the drain. ? · You cannot remove a clot from the tube by milking the tube. Where can you learn more? Go to http://ngoc-erin.info/. Enter K117 in the search box to learn more about \"Surgical Drain Care: Care Instructions. \" Current as of: March 20, 2017 Content Version: 11.4 © 4349-4889 [employer]. Care instructions adapted under license by [employer] (which disclaims liability or warranty for this information). If you have questions about a medical condition or this instruction, always ask your healthcare professional. [address] any warranty or liability for your use of this information. Introducing [facility]! [facility] introduces [employer] patient portal. Now you can access parts of your medical record, email your doctor's office, and request medication refills online. 1. In your internet browser, go to [email or web address] 2. Click on the First Time User? Click Here link in the Sign In box. You will see the New Member Sign Up page. 3. Enter your BPe[employer] Access Code exactly as it appears below. You will not need to use this code after youve completed the sign-up process. If you do not sign up before the expiration date, you must request a new code. · BPe[employer] Access Code: [email or web address] Expires: 6/13/2018  2:09 PM 
 
4. Enter the last four digits of your Social Security Number (xxxx) and Date of Birth (mm/dd/yyyy) as indicated and click Submit. You will be taken to the next sign-up page. 5. Create a BPeSA ID. This will be your BPe[employer] login ID and cannot be changed, so think of one that is secure and easy to remember. 6. Create a BPe[employer] password. You can change your password at any time. 7. Enter your Password Reset Question and Answer. This can be used at a later time if you forget your password. 8. Enter your e-mail address. You will receive e-mail notification when new information is available in [address] 9. Click Sign Up. You can now view and download portions of your medical record. 10. Click the Download Summary menu link to download a portable copy of your medical information. If you have questions, please visit the Frequently Asked Questions section of the [employer] website. Remember, [employer] is NOT to be used for urgent needs. For medical emergencies, dial 911. Now available from your iPhone and Android! Providers Seen During Your Hospitalization Provider Specialty Primary office phone Cyndi Tavarez MD Plastic Surgery [phone] Your Primary Care Physician (PCP) Primary Care Physician Office Phone Office Fax [phone] [facility], [address] [place] [address] You are allergic to the following No active allergies Recent Documentation Height Weight BMI OB Status Smoking Status 1.651 m 62.4 kg 22.89 kg/m2 Hysterectomy Never Smoker Emergency Contacts Name Discharge Info Relation Home Work Mobile [address] [facility] [place]  Spouse [3] [phone] Patient Belongings The following personal items are in your possession at time of discharge: 
  Dental Appliances: None  Visual Aid: Glasses Please provide this summary of care documentation to your next provider. Signatures-by signing, you are acknowledging that this After Visit Summary has been reviewed with you and you have received a copy. Patient Signature:  ____________________________________________________________ Date:  ____________________________________________________________  
  
Krishna Nan Provider Signature:  ____________________________________________________________ Date:  ____________________________________________________________

## 2018-03-21 NOTE — ANESTHESIA POSTPROCEDURE EVALUATION
Post-Anesthesia Evaluation and Assessment    Patient: Yeimy Lynn MRN: [id number]  SSN: [id number]    YOB: 1965  Age: 47 y.o. Sex: female       Cardiovascular Function/Vital Signs  Visit Vitals    /73 (BP 1 Location: Right arm, BP Patient Position: At rest)    Pulse 82    Temp 36.4 °C (97.6 °F)    Resp 18    Ht 5' 5\" (1.651 m)    Wt 62.4 kg (137 lb 9 oz)    SpO2 99%    BMI 22.89 kg/m2       Patient is status post general anesthesia for Procedure(s):  BILATERAL BREAST REDUCTION. Nausea/Vomiting: None    Postoperative hydration reviewed and adequate. Pain:  Pain Scale 1: Visual (03/21/18 1110)  Pain Intensity 1: 0 (03/21/18 1110)   Managed    Neurological Status:   Neuro (WDL): Exceptions to WDL (03/21/18 1110)  Neuro  Neurologic State: Sleeping (03/21/18 1110)  LUE Motor Response: Purposeful (03/21/18 1110)  LLE Motor Response: Purposeful (03/21/18 1110)  RUE Motor Response: Purposeful (03/21/18 1110)  RLE Motor Response: Purposeful (03/21/18 1110)   At baseline    Mental Status and Level of Consciousness: Arousable    Pulmonary Status:   O2 Device: Room air (03/21/18 1110)   Adequate oxygenation and airway patent    Complications related to anesthesia: None    Post-anesthesia assessment completed.  No concerns    Signed By: Makeda Noel MD     March 21, 2018

## 2018-04-12 NOTE — OP NOTES
[facility] REPORT    Octaviano Blizzard.  MR#: [id number]  : 1965  ACCOUNT #: [de-identified]   DATE OF SERVICE: 2018    PREOPERATIVE DIAGNOSIS:  Bilateral symptomatic macromastia. POSTOPERATIVE DIAGNOSIS:  Bilateral symptomatic macromastia. PROCEDURE PERFORMED:  Bilateral reduction mammoplasty. SURGEON:  Dr. Guy Villalba    ASSISTANT:  Chad COX    ANESTHESIA:  General endotracheal.    ESTIMATED BLOOD LOSS:  75cc    SPECIMENS REMOVED: Breast Tissue    COMPLICATIONS: none    IMPLANTS:  none    INDICATION FOR SURGERY:  This patient presents with the above complaints. She presents now for elective surgery. OPERATIVE IN DETAIL:  The patient was taken to the operating suite and placed in supine position. When adequate general endotracheal anesthesia was obtained, the patient was prepped and draped in a sterile fashion. At this time, she was marked with an 8 cm inferior pedicle which was deepithelialized with the exception of the nipple areolar complex. When this was complete, the right Wise pattern incisions were deepened through the skin to subcutaneous tissue and dissected up and over the breast mound. Medial, central and lateral breast tissue was excised. The pedicle was plicated with a 2 cm plication using 3-0 Vicryl suture. A 15 round YONI drain was placed and the skin edges were temporarily reapproximated in the vertical and the horizontal limbs to reapproximate the skin edges. A new nipple position was marked 6.5 cm above the inframammary fold, which was excised. The nipple was brought into position. The periareolar vertical and the horizontal limbs were closed with 3-0 Vicryl suture in the deep dermis followed by a 4-0 Monocryl in a running subcuticular fashion to reapproximate the skin edges. Total removal on the right side was 471 grams.   Attention was then turned to the left side where the same dissection was performed again up and over the breast mound. Medial, central and lateral breast tissue was removed. The pedicle was plicated. A drain was placed and the skin edges were temporarily reapproximated. Sternal notch and xiphoid process suture was placed and triangulated for nipple position on the left side, which was marked and excised. The nipple was brought into position. Periareolar, vertical and the horizontal limbs were closed again with 3-0 Vicryl suture in the deep dermis followed by a 4-0 Monocryl in a running subcuticular fashion to reapproximate skin edges. Total removal on the left side was 503 grams. All wounds were dressed with antibiotic ointment, Xeroform gauze and she was placed in a postop compressive bra.       MD Magan Becerra / Paty.Sangeetha  D: 04/12/2018 10:32     T: 04/12/2018 13:28  JOB #: [id number]

## 2021-11-12 ENCOUNTER — APPOINTMENT (RX ONLY)
Dept: URBAN - NONMETROPOLITAN AREA CLINIC 1 | Facility: CLINIC | Age: 56
Setting detail: DERMATOLOGY
End: 2021-11-12

## 2021-11-12 DIAGNOSIS — L65.9 NONSCARRING HAIR LOSS, UNSPECIFIED: ICD-10-CM | Status: INADEQUATELY CONTROLLED

## 2021-11-12 DIAGNOSIS — L82.1 OTHER SEBORRHEIC KERATOSIS: ICD-10-CM | Status: STABLE

## 2021-11-12 PROCEDURE — ? FULL BODY SKIN EXAM - DECLINED

## 2021-11-12 PROCEDURE — ? PRESCRIPTION

## 2021-11-12 PROCEDURE — ? MEDICATION COUNSELING

## 2021-11-12 PROCEDURE — ? TREATMENT REGIMEN

## 2021-11-12 PROCEDURE — 99204 OFFICE O/P NEW MOD 45 MIN: CPT

## 2021-11-12 PROCEDURE — ? COUNSELING

## 2021-11-12 PROCEDURE — ? ADDITIONAL NOTES

## 2021-11-12 PROCEDURE — ? ORDER TESTS

## 2021-11-12 RX ORDER — FLUOCINOLONE ACETONIDE 0.1 MG/ML
SOLUTION TOPICAL
Qty: 60 | Refills: 2 | Status: ERX | COMMUNITY
Start: 2021-11-12

## 2021-11-12 RX ADMIN — FLUOCINOLONE ACETONIDE: 0.1 SOLUTION TOPICAL at 00:00

## 2021-11-12 ASSESSMENT — LOCATION DETAILED DESCRIPTION DERM
LOCATION DETAILED: LEFT PROXIMAL DORSAL FOREARM
LOCATION DETAILED: RIGHT ELBOW
LOCATION DETAILED: POSTERIOR MID-PARIETAL SCALP

## 2021-11-12 ASSESSMENT — LOCATION SIMPLE DESCRIPTION DERM
LOCATION SIMPLE: LEFT FOREARM
LOCATION SIMPLE: POSTERIOR SCALP
LOCATION SIMPLE: RIGHT ELBOW

## 2021-11-12 ASSESSMENT — LOCATION ZONE DERM
LOCATION ZONE: SCALP
LOCATION ZONE: ARM

## 2021-11-12 NOTE — PROCEDURE: TREATMENT REGIMEN
Detail Level: Zone
Plan: Referred to Javier to consider Proscriptix line.
Initiate Treatment: Fluocinolone 0.1% Solution (apply to scalp BID BIW)

## 2021-11-12 NOTE — PROCEDURE: ORDER TESTS
Performing Laboratory: 0
Billing Type: Third-Party Bill
Bill For Surgical Tray: no
Expected Date Of Service: 11/12/2021

## 2021-11-12 NOTE — HPI: HAIR LOSS
Additional History: Patient says that her hair began falling out profusely 3 months ago after being diagnosed with COVID. She does admit to having issues with kidney function and also experiences mild itchiness and inflammation in her scalp. She has been taking biotin, folic acid, and washes her hair with Nizoral for 2 months, but does not feel this has been helpful. The patient also has some scaly lesions on her arms that she would like evaluated.

## 2021-11-12 NOTE — PROCEDURE: MIPS QUALITY
Patient called for refill. Has appt  
Quality 226: Preventive Care And Screening: Tobacco Use: Screening And Cessation Intervention: Patient screened for tobacco use and is an ex/non-smoker
Detail Level: Detailed
Quality 130: Documentation Of Current Medications In The Medical Record: Current Medications Documented
Quality 431: Preventive Care And Screening: Unhealthy Alcohol Use - Screening: Patient not identified as an unhealthy alcohol user when screened for unhealthy alcohol use using a systematic screening method

## 2022-05-13 NOTE — PROCEDURE: MEDICATION COUNSELING
Cyclophosphamide Counseling:  I discussed with the patient the risks of cyclophosphamide including but not limited to hair loss, hormonal abnormalities, decreased fertility, abdominal pain, diarrhea, nausea and vomiting, bone marrow suppression and infection. The patient understands that monitoring is required while taking this medication. Yes

## (undated) DEVICE — SUTURE VCRL SZ 2-0 L27IN ABSRB UD L36MM CP-1 1/2 CIR REV J266H

## (undated) DEVICE — SUTURE MCRYL SZ 4-0 L27IN ABSRB UD L19MM PS-2 1/2 CIR PRIM Y426H

## (undated) DEVICE — AMD ANTIMICROBIAL GAUZE SPONGES,12 PLY USP TYPE VII, 0.2% POLYHEXAMETHYLENE BIGUANIDE HCI (PHMB): Brand: CURITY

## (undated) DEVICE — GOWN,REINF,POLY,ECL,PP SLV,XL: Brand: MEDLINE

## (undated) DEVICE — DRAIN WND 15FR RND HUBLESS SIL W/ 3/16IN TRCR BLAK

## (undated) DEVICE — REM POLYHESIVE ADULT PATIENT RETURN ELECTRODE: Brand: VALLEYLAB

## (undated) DEVICE — OCCLUSIVE GAUZE STRIP,3% BISMUTH TRIBROMOPHENATE IN PETROLATUM BLEND: Brand: XEROFORM

## (undated) DEVICE — 2000CC GUARDIAN II: Brand: GUARDIAN

## (undated) DEVICE — TRAY PREP DRY W/ PREM GLV 2 APPL 6 SPNG 2 UNDPD 1 OVERWRAP

## (undated) DEVICE — SUTURE ABSRB X-1 REV CUT 1/2 CIR 22MM UD BRAID 27IN SZ 3-0 J458H

## (undated) DEVICE — Device

## (undated) DEVICE — DRAPE TWL SURG 16X26IN BLU ORB04] ALLCARE INC]

## (undated) DEVICE — UTILITY MARKER,BLACK WITH LABELS: Brand: DEVON

## (undated) DEVICE — CONTAINER SPEC HISTOLOGY 900ML POLYPR

## (undated) DEVICE — DRAPE,TOP,102X53,STERILE: Brand: MEDLINE

## (undated) DEVICE — SLIM BODY SKIN STAPLER: Brand: APPOSE ULC

## (undated) DEVICE — KENDALL SCD EXPRESS SLEEVES, KNEE LENGTH, MEDIUM: Brand: KENDALL SCD

## (undated) DEVICE — SPONGE LAP 18X18IN STRL -- 5/PK

## (undated) DEVICE — SURGICAL PROCEDURE PACK BASIC ST FRANCIS

## (undated) DEVICE — SOLUTION IV 1000ML 0.9% SOD CHL

## (undated) DEVICE — SUTURE PERMA HND SZ 2 0 L18IN NONABSORBABLE BLK L19MM PS 2 583H

## (undated) DEVICE — SUT ETHLN 3-0 18IN PS2 BLK --

## (undated) DEVICE — ABDOMINAL PAD: Brand: DERMACEA